# Patient Record
Sex: FEMALE | Race: OTHER
[De-identification: names, ages, dates, MRNs, and addresses within clinical notes are randomized per-mention and may not be internally consistent; named-entity substitution may affect disease eponyms.]

---

## 2018-01-17 ENCOUNTER — HOSPITAL ENCOUNTER (EMERGENCY)
Dept: HOSPITAL 61 - ER | Age: 28
Discharge: HOME | End: 2018-01-17
Payer: SELF-PAY

## 2018-01-17 DIAGNOSIS — J09.X2: Primary | ICD-10-CM

## 2018-01-17 LAB
INFLUENZA A PATIENT: POSITIVE
INFLUENZA B PATIENT: NEGATIVE
OBC FLU: (no result)

## 2018-01-17 PROCEDURE — 87804 INFLUENZA ASSAY W/OPTIC: CPT

## 2018-01-17 PROCEDURE — 99284 EMERGENCY DEPT VISIT MOD MDM: CPT

## 2018-01-17 RX ADMIN — IBUPROFEN 1 MG: 800 TABLET ORAL at 09:23

## 2018-05-06 ENCOUNTER — HOSPITAL ENCOUNTER (EMERGENCY)
Dept: HOSPITAL 61 - ER | Age: 28
Discharge: HOME | End: 2018-05-06
Payer: SELF-PAY

## 2018-05-06 DIAGNOSIS — D50.9: ICD-10-CM

## 2018-05-06 DIAGNOSIS — N39.0: Primary | ICD-10-CM

## 2018-05-06 LAB
ADD MAN DIFF?: NO
ALBUMIN SERPL-MCNC: 3.2 G/DL (ref 3.4–5)
ALBUMIN/GLOB SERPL: 0.6 {RATIO} (ref 1–1.7)
ALP SERPL-CCNC: 119 U/L (ref 46–116)
ALT (SGPT): 18 U/L (ref 14–59)
ANION GAP SERPL CALC-SCNC: 11 MMOL/L (ref 6–14)
AST SERPL-CCNC: 16 U/L (ref 15–37)
BACTERIA #/AREA URNS HPF: (no result) /HPF
BASO #: 0.1 X10^3/UL (ref 0–0.2)
BASO %: 1 % (ref 0–3)
BILIRUBIN,URINE: NEGATIVE
BLOOD UREA NITROGEN: 12 MG/DL (ref 7–20)
BUN/CREAT SERPL: 20 (ref 6–20)
CALCIUM: 8.6 MG/DL (ref 8.5–10.1)
CHLORIDE: 104 MMOL/L (ref 98–107)
CLARITY,URINE: CLEAR
CO2 SERPL-SCNC: 26 MMOL/L (ref 21–32)
COLOR,URINE: (no result)
CREAT SERPL-MCNC: 0.6 MG/DL (ref 0.6–1)
EOS #: 0.2 X10^3/UL (ref 0–0.7)
EOS %: 2 % (ref 0–3)
GFR SERPLBLD BASED ON 1.73 SQ M-ARVRAT: 119.9 ML/MIN
GLOBULIN SER-MCNC: 5.8 G/DL (ref 2.2–3.8)
GLUCOSE SERPL-MCNC: 92 MG/DL (ref 70–99)
GLUCOSE,URINE: NEGATIVE MG/DL
HCG SERPL-ACNC: 11.4 X10^3/UL (ref 4–11)
HEMATOCRIT: 35.6 % (ref 36–47)
HEMOGLOBIN: 11.3 G/DL (ref 12–15.5)
LIPASE: 60 U/L (ref 73–393)
LYMPH #: 2.5 X10^3/UL (ref 1–4.8)
LYMPH %: 22 % (ref 24–48)
MEAN CORPUSCULAR HEMOGLOBIN: 23 PG (ref 25–35)
MEAN CORPUSCULAR HGB CONC: 32 G/DL (ref 31–37)
MEAN CORPUSCULAR VOLUME: 73 FL (ref 79–100)
MONO #: 0.7 X10^3/UL (ref 0–1.1)
MONO %: 6 % (ref 0–9)
NEUT #: 7.9 X10^3UL (ref 1.8–7.7)
NEUT %: 69 % (ref 31–73)
NITRITE UR QL STRIP: POSITIVE
PH,URINE: 6
PLATELET COUNT: 263 X10^3/UL (ref 140–400)
POTASSIUM SERPL-SCNC: 3.6 MMOL/L (ref 3.5–5.1)
PROTEIN,URINE: NEGATIVE MG/DL
RBC,URINE: 0 /HPF (ref 0–2)
RED BLOOD COUNT: 4.86 X10^6/UL (ref 3.5–5.4)
RED CELL DISTRIBUTION WIDTH: 17.1 % (ref 11.5–14.5)
SODIUM: 141 MMOL/L (ref 136–145)
SPECIFIC GRAVITY,URINE: >=1.03
SQUAMOUS EPITHELIAL CELL,UR: (no result) /LPF
TOTAL BILIRUBIN: 0.4 MG/DL (ref 0.2–1)
TOTAL PROTEIN: 9 G/DL (ref 6.4–8.2)
URINE HCG POC: (no result)
UROBILINOGEN,URINE: 1 MG/DL
WBC #/AREA URNS HPF: (no result) /HPF (ref 0–4)

## 2018-05-06 PROCEDURE — 81001 URINALYSIS AUTO W/SCOPE: CPT

## 2018-05-06 PROCEDURE — 83690 ASSAY OF LIPASE: CPT

## 2018-05-06 PROCEDURE — 81025 URINE PREGNANCY TEST: CPT

## 2018-05-06 PROCEDURE — 96374 THER/PROPH/DIAG INJ IV PUSH: CPT

## 2018-05-06 PROCEDURE — 36415 COLL VENOUS BLD VENIPUNCTURE: CPT

## 2018-05-06 PROCEDURE — 96375 TX/PRO/DX INJ NEW DRUG ADDON: CPT

## 2018-05-06 PROCEDURE — 99285 EMERGENCY DEPT VISIT HI MDM: CPT

## 2018-05-06 PROCEDURE — 80053 COMPREHEN METABOLIC PANEL: CPT

## 2018-05-06 PROCEDURE — 85025 COMPLETE CBC W/AUTO DIFF WBC: CPT

## 2018-05-06 PROCEDURE — 76705 ECHO EXAM OF ABDOMEN: CPT

## 2018-05-06 PROCEDURE — 74177 CT ABD & PELVIS W/CONTRAST: CPT

## 2018-05-06 RX ADMIN — MORPHINE SULFATE 1 MG: 4 INJECTION, SOLUTION INTRAMUSCULAR; INTRAVENOUS at 13:54

## 2018-05-06 RX ADMIN — ONDANSETRON 1 MG: 2 INJECTION INTRAMUSCULAR; INTRAVENOUS at 13:54

## 2019-02-09 ENCOUNTER — HOSPITAL ENCOUNTER (EMERGENCY)
Dept: HOSPITAL 61 - ER | Age: 29
Discharge: HOME | End: 2019-02-09
Payer: SELF-PAY

## 2019-02-09 VITALS — DIASTOLIC BLOOD PRESSURE: 76 MMHG | SYSTOLIC BLOOD PRESSURE: 118 MMHG

## 2019-02-09 VITALS — WEIGHT: 120 LBS | BODY MASS INDEX: 20.49 KG/M2 | HEIGHT: 64 IN

## 2019-02-09 DIAGNOSIS — R51: Primary | ICD-10-CM

## 2019-02-09 DIAGNOSIS — R53.1: ICD-10-CM

## 2019-02-09 PROCEDURE — 99281 EMR DPT VST MAYX REQ PHY/QHP: CPT

## 2019-02-09 NOTE — PHYS DOC
Past Medical History


Past Medical History:  Kidney Infection


Past Surgical History:  No Surgical History


Alcohol Use:  None


Drug Use:  None





Adult General


Chief Complaint


Chief Complaint:  OTHER COMPLAINTS





HPI


HPI





29 y/o female presents to ER requesting work note as she had taken off work 

yest. d/t HA. Pt reports she has hx of frequent HAs with wkly HAs. She reports 

HA yest. was similar to previous ones she had experienced and she took tylenol 

with HA subsiding. She reports today she has had no headache denying any 

complaints. LMP last wk NL. She denies having PCP. She denies fever, cold or 

flu like illness, or recent travel. She denies any recent injury/falls.





Review of Systems


Review of Systems





Constitutional: Denies fever or chills []


Eyes: Denies change in visual acuity, redness, or eye pain []


HENT: Denies nasal congestion or sore throat []


Respiratory: Denies cough or shortness of breath []


Cardiovascular: No additional information not addressed in HPI []


GI: Denies abdominal pain, nausea, vomiting, bloody stools or diarrhea []


: Denies urinary sxs


Musculoskeletal: Denies/neck back pain or joint pain []


Integument: Denies rash or skin lesions []


Neurologic: Denies headache today- reports diffuse headache yest, focal 

weakness or sensory changes. Denies dizziness








All other systems were reviewed and found to be within normal limits, except as 

documented in this note.





Allergies


Allergies





Allergies








Coded Allergies Type Severity Reaction Last Updated Verified


 


  No Known Drug Allergies    1/17/18 No











Physical Exam


Physical Exam





Constitutional: Well developed, well nourished, no acute distress, non-toxic 

appearance. Clear speech


HENT: Normocephalic, atraumatic, bilateral ears normal, oropharynx moist- no 

pharyngeal swelling/erythema/exudate


Eyes: 3mm PERRLA, no nystagmus, no pain with eye movements, conjunctiva normal, 

no discharge. [] 


Neck: Normal range of motion, no tenderness, supple, no gross adenopathy


Cardiovascular: Heart rate regular rhythm, no murmur []


Lungs & Thorax:  Bilateral breath sounds clear to auscultation []


Skin: Warm, dry


Back: Full ROM


Extremities: ROM intact


Neurologic: Alert and oriented X 3, normal motor function, normal sensory 

function, no focal deficits noted. []


Psychologic: Affect normal, judgement normal, mood normal. []





Current Patient Data


Vital Signs





 Vital Signs








  Date Time  Temp Pulse Resp B/P (MAP) Pulse Ox O2 Delivery O2 Flow Rate FiO2


 


2/9/19 11:47 98.6 67 14 118/76 (90) 100 Room Air  





 98.6       











EKG


EKG


[]





Radiology/Procedures


Radiology/Procedures


[]





Course & Med Decision Making


Course & Med Decision Making








Pt came to ER requesting work note for missed work yest. as she had HA. Pt 

reports she hasn't had tests for freq. HAs and although labs were discussed she 

is not wanting any tests stating she came for work note only as she isn't 

experiencing any HA or other sxs today. Explained to pt that note could be 

provided that she was evaluated in the ER today however could not provide for 

yest. as she wasn't evaluated by this provider. Pt had no complaints and denied 

HA today. She was in no distress with NL exam. Discussed need for her to 

establish PCP if HAs continue for further care/eval. Will provide community 

clinic/physician info with discharge paperwork. Education provided on s&s to 

return to ER for and discharge instructions were discussed. Pt was agreeable 

with note for work that she had been evaluated today in the ER.





Dragon Disclaimer


Dragon Disclaimer


This electronic medical record was generated, in whole or in part, using a 

voice recognition dictation system.





Departure


Departure


Impression:  


 Primary Impression:  


 Headache


Disposition:  01 HOME, SELF-CARE


Condition:  STABLE


Referrals:  


NO PCP (PCP)


Patient Instructions:  General Headache Without Cause





Additional Instructions:  


Drink plenty of fluids with well balanced meals.





Follow-up with primary doctor if symptoms persist and with frequent headaches 

for further care and evaluation.





Tylenol and/or ibuprofen as needed for pain as directed on container.











PITA COE Feb 9, 2019 12:41

## 2021-08-15 ENCOUNTER — HOSPITAL ENCOUNTER (EMERGENCY)
Dept: HOSPITAL 61 - ER | Age: 31
Discharge: HOME | End: 2021-08-15
Payer: SELF-PAY

## 2021-08-15 VITALS — WEIGHT: 170.2 LBS | BODY MASS INDEX: 30.16 KG/M2 | HEIGHT: 63 IN

## 2021-08-15 VITALS — DIASTOLIC BLOOD PRESSURE: 73 MMHG | SYSTOLIC BLOOD PRESSURE: 117 MMHG

## 2021-08-15 DIAGNOSIS — M54.5: Primary | ICD-10-CM

## 2021-08-15 DIAGNOSIS — Z88.6: ICD-10-CM

## 2021-08-15 LAB
APTT PPP: YELLOW S
BACTERIA #/AREA URNS HPF: 0 /HPF
BILIRUB UR QL STRIP: NEGATIVE
FIBRINOGEN PPP-MCNC: CLEAR MG/DL
NITRITE UR QL STRIP: NEGATIVE
PH UR STRIP: 6.5 [PH]
PROT UR STRIP-MCNC: NEGATIVE MG/DL
RBC #/AREA URNS HPF: (no result) /HPF (ref 0–2)
UROBILINOGEN UR-MCNC: 0.2 MG/DL
WBC #/AREA URNS HPF: (no result) /HPF (ref 0–4)

## 2021-08-15 PROCEDURE — 99284 EMERGENCY DEPT VISIT MOD MDM: CPT

## 2021-08-15 PROCEDURE — 96372 THER/PROPH/DIAG INJ SC/IM: CPT

## 2021-08-15 PROCEDURE — 81025 URINE PREGNANCY TEST: CPT

## 2021-08-15 PROCEDURE — 81001 URINALYSIS AUTO W/SCOPE: CPT

## 2021-08-15 NOTE — ED.ADGEN
Past Medical History


Past Medical History:  Kidney Infection


Past Surgical History:  No Surgical History


Smoking Status:  Never Smoker


Alcohol Use:  None


Drug Use:  None





General Adult


EDM:


Chief Complaint:  BACK PAIN - NO INJURY





HPI:


HPI:





Patient is a 30  year old female who presents emergency department with compl

aints of bilateral lower back pain for the last 3 to 4 months.  She denies any 

injury or fall.  Patient states that the pain began after she had been moving 

heavy items at work.  She denies any numbness, tingling, weakness of her lower 

extremities.  Patient reports she did notice increase in the pain for the last 2

days.  She states that she also she was having some blood when wiping after 

urination.  She denies any hematuria, dysuria, increased urinary frequency, or 

difficulty voiding.  Patient denies any fever, cough, shortness of breath, 

abdominal pain, nausea, vomiting, diarrhea, body aches, or fatigue.  She 

currently rates the pain a 6 out of 10 on pain scale, she denies any alleviating

factors, pain is worse with movement and palpation.





Review of Systems:


Review of Systems:


Complete ROS is negative unless otherwise noted in the HPI.





Current Medications:





Current Medications








 Medications


  (Trade)  Dose


 Ordered  Sig/Myesha  Start Time


 Stop Time Status Last Admin


Dose Admin


 


 Ketorolac


 Tromethamine


  (Toradol 30mg


 Vial)  30 mg  1X  ONCE  8/15/21 23:30


 8/15/21 23:31 DC 8/15/21 23:00


30 MG


 


 Orphenadrine


 Citrate


  (Norflex)  60 mg  1X  ONCE  8/15/21 23:30


 8/15/21 23:31 DC 8/15/21 23:00


60 MG











Allergies:


Allergies:





Allergies








Coded Allergies Type Severity Reaction Last Updated Verified


 


  acetaminophen Allergy Intermediate Hives / Rash 8/15/21 Yes











Physical Exam:


PE:


See above


Constitutional: Well developed, well nourished, no acute distress, non-toxic 

appearance. []


HENT: Normocephalic, atraumatic, bilateral external ears normal, nose normal. []


Eyes: PERRLA, EOMI, conjunctiva normal, no discharge. [] 


Neck: Normal range of motion, no stridor. [] 


Cardiovascular:Heart rate regular rhythm


Lungs & Thorax:  Respirations even and unlabored, no retractions, no respiratory

 distress


Back: No bony tenderness, bilateral lumbar paraspinal tenderness, negative 

straight leg lift bilaterally, no CVA tenderness


Skin: Warm, dry, no erythema, no rash. [] 


Extremities: No cyanosis, ROM intact, no edema. [] 


Neurologic: Alert and oriented X 3, normal motor, normal sensory, no focal 

deficits noted. []


Psychologic: Affect normal, judgement normal, mood normal. []





Current Patient Data:


Labs:





                                Laboratory Tests








Test


 8/15/21


21:55


 


Urine Collection Type Unknown  


 


Urine Color Yellow  


 


Urine Clarity Clear  


 


Urine pH


 6.5 (<5.0-8.0)





 


Urine Specific Gravity


 <=1.005


(1.000-1.030)


 


Urine Protein


 Negative mg/dL


(NEG-TRACE)


 


Urine Glucose (UA)


 Negative mg/dL


(NEG)


 


Urine Ketones (Stick)


 Negative mg/dL


(NEG)


 


Urine Blood


 Negative (NEG)





 


Urine Nitrite


 Negative (NEG)





 


Urine Bilirubin


 Negative (NEG)





 


Urine Urobilinogen Dipstick


 0.2 mg/dL (0.2


mg/dL)


 


Urine Leukocyte Esterase


 Negative (NEG)





 


Urine RBC


 Rare /HPF


(0-2)


 


Urine WBC


 Rare /HPF


(0-4)


 


Urine Squamous Epithelial


Cells Few /LPF  





 


Urine Bacteria


 0 /HPF (0-FEW)





 


POC Urine HCG, Qualitative


 Hcg negative


(Negative)








Vital Signs:





                                   Vital Signs








  Date Time  Temp Pulse Resp B/P (MAP) Pulse Ox O2 Delivery O2 Flow Rate FiO2


 


8/15/21 23:47  66 18 117/73 (88) 100   


 


8/15/21 21:40 98.3     Room Air  





 98.3       











EKG:


EKG:


[]





Heart Score:


C/O Chest Pain:  No





Radiology/Procedures:


Radiology/Procedures:


[]





Course & Med Decision Making:


Course & Med Decision Making


Pertinent Labs and Imaging studies reviewed. (See chart for details)





[]Patients Care and treatment plan provided by ER Nurse Practitioner.  I was 

available for consult.  Patient's chart reviewed.





Dragon Disclaimer:


Dragon Disclaimer:


This electronic medical record was generated, in whole or in part, using a voice

 recognition dictation system.





Departure


Departure


Impression:  


   Primary Impression:  


   Low back pain


Disposition:  01 HOME / SELF CARE / HOMELESS


Condition:  STABLE


Referrals:  


NO PCP (PCP)


Patient Instructions:  Back Pain, Adult, Easy-to-Read





Additional Instructions:  


Fill the prescription(s) and use as directed. Apply heat or ice for to sore 

areas as needed for comfort. Activity as tolerated. Follow up with your primary 

care doctor this week if symptoms persist, return to the ER if symptoms worsen 

or you develop a fever.


Scripts


Naproxen (NAPROXEN) 500 Mg Tablet


1 TAB PO BID PRN for PAIN for 10 Days, #20 TAB 0 Refills


   Prov: NEGAR TEMPLETON         8/15/21 


Cyclobenzaprine Hcl (CYCLOBENZAPRINE HCL) 10 Mg Tablet


1 TAB PO TID PRN for PAIN for 10 Days, #30 TAB 0 Refills


   Prov: NEGAR TEMPLETON         8/15/21





Problem Qualifiers








   Primary Impression:  


   Low back pain


   Chronicity:  acute  Back pain laterality:  bilateral  Sciatica presence:  

   without sciatica  Qualified Codes:  M54.5 - Low back pain








NEGAR TEMPLETON       Aug 15, 2021 23:40


DARWIN WATTS DO            Aug 19, 2021 18:13